# Patient Record
Sex: MALE
[De-identification: names, ages, dates, MRNs, and addresses within clinical notes are randomized per-mention and may not be internally consistent; named-entity substitution may affect disease eponyms.]

---

## 2020-03-21 ENCOUNTER — HOSPITAL ENCOUNTER (INPATIENT)
Dept: HOSPITAL 19 - MEDICAL | Age: 50
LOS: 1 days | Discharge: HOME | DRG: 440 | End: 2020-03-22
Attending: STUDENT IN AN ORGANIZED HEALTH CARE EDUCATION/TRAINING PROGRAM | Admitting: STUDENT IN AN ORGANIZED HEALTH CARE EDUCATION/TRAINING PROGRAM
Payer: OTHER GOVERNMENT

## 2020-03-21 VITALS — SYSTOLIC BLOOD PRESSURE: 113 MMHG | DIASTOLIC BLOOD PRESSURE: 63 MMHG | HEART RATE: 64 BPM | TEMPERATURE: 99.5 F

## 2020-03-21 VITALS — WEIGHT: 172.18 LBS | BODY MASS INDEX: 27.02 KG/M2 | HEIGHT: 67 IN

## 2020-03-21 VITALS — HEART RATE: 98 BPM | TEMPERATURE: 98.7 F | SYSTOLIC BLOOD PRESSURE: 96 MMHG | DIASTOLIC BLOOD PRESSURE: 58 MMHG

## 2020-03-21 VITALS — TEMPERATURE: 98.9 F | DIASTOLIC BLOOD PRESSURE: 76 MMHG | HEART RATE: 77 BPM | SYSTOLIC BLOOD PRESSURE: 122 MMHG

## 2020-03-21 VITALS — HEART RATE: 86 BPM | TEMPERATURE: 98.2 F | SYSTOLIC BLOOD PRESSURE: 117 MMHG | DIASTOLIC BLOOD PRESSURE: 68 MMHG

## 2020-03-21 VITALS — DIASTOLIC BLOOD PRESSURE: 60 MMHG | HEART RATE: 68 BPM | TEMPERATURE: 98.1 F | SYSTOLIC BLOOD PRESSURE: 110 MMHG

## 2020-03-21 VITALS — SYSTOLIC BLOOD PRESSURE: 95 MMHG | HEART RATE: 63 BPM | TEMPERATURE: 97.9 F | DIASTOLIC BLOOD PRESSURE: 41 MMHG

## 2020-03-21 VITALS — DIASTOLIC BLOOD PRESSURE: 76 MMHG | HEART RATE: 77 BPM | SYSTOLIC BLOOD PRESSURE: 122 MMHG | TEMPERATURE: 98.9 F

## 2020-03-21 DIAGNOSIS — K85.90: Primary | ICD-10-CM

## 2020-03-21 DIAGNOSIS — F17.210: ICD-10-CM

## 2020-03-21 DIAGNOSIS — Z88.5: ICD-10-CM

## 2020-03-21 DIAGNOSIS — K86.1: ICD-10-CM

## 2020-03-21 LAB
ALBUMIN SERPL-MCNC: 3.8 GM/DL (ref 3.5–5)
ALP SERPL-CCNC: 58 U/L (ref 50–136)
ALT SERPL-CCNC: 25 U/L (ref 4–49)
ANION GAP SERPL CALC-SCNC: 8 MMOL/L (ref 7–16)
AST SERPL-CCNC: 23 U/L (ref 15–37)
BASOPHILS # BLD: 0.1 10*3/UL (ref 0–0.2)
BASOPHILS NFR BLD AUTO: 0.4 % (ref 0–2)
BILIRUB SERPL-MCNC: 0.5 MG/DL (ref 0–1)
BUN SERPL-MCNC: 10 MG/DL (ref 9–20)
CALCIUM SERPL-MCNC: 8.5 MG/DL (ref 8.4–10.2)
CHLORIDE SERPL-SCNC: 107 MMOL/L (ref 98–107)
CO2 SERPL-SCNC: 25 MMOL/L (ref 22–30)
CREAT SERPL-SCNC: 0.78 UMOL/L (ref 0.66–1.25)
EOSINOPHIL # BLD: 0.2 10*3/UL (ref 0–0.7)
EOSINOPHIL NFR BLD: 1.6 % (ref 0–4)
ERYTHROCYTE [DISTWIDTH] IN BLOOD BY AUTOMATED COUNT: 14.6 % (ref 11.5–14.5)
GLUCOSE SERPL-MCNC: 85 MG/DL (ref 74–106)
GRANULOCYTES # BLD AUTO: 59 % (ref 42.2–75.2)
HCT VFR BLD AUTO: 43.8 % (ref 42–52)
HGB BLD-MCNC: 13.8 G/DL (ref 13.5–18)
INR BLD: 1.1 (ref 0.8–3)
LDH SERPL-CCNC: 334 U/L (ref 313–618)
LIPASE SERPL-CCNC: 447 U/L (ref 23–300)
LYMPHOCYTES # BLD: 3.5 10*3/UL (ref 1.2–3.4)
LYMPHOCYTES NFR BLD: 30.3 % (ref 20–51)
MAGNESIUM SERPL-MCNC: 2.3 MG/DL (ref 1.6–2.3)
MCH RBC QN AUTO: 27 PG (ref 27–31)
MCHC RBC AUTO-ENTMCNC: 32 G/DL (ref 33–37)
MCV RBC AUTO: 84 FL (ref 80–100)
MONOCYTES # BLD: 1 10*3/UL (ref 0.1–0.6)
MONOCYTES NFR BLD AUTO: 8.5 % (ref 1.7–9.3)
NEUTROPHILS # BLD: 6.8 10*3/UL (ref 1.4–6.5)
PHOSPHATE SERPL-MCNC: 4 MG/DL (ref 2.5–4.5)
PLATELET # BLD AUTO: 292 K/MM3 (ref 130–400)
PMV BLD AUTO: 9 FL (ref 7.4–10.4)
POTASSIUM SERPL-SCNC: 3.9 MMOL/L (ref 3.4–5)
PROT SERPL-MCNC: 6.6 GM/DL (ref 6.4–8.2)
PROTHROMBIN TIME: 12.6 SECONDS (ref 9.7–12.8)
RBC # BLD AUTO: 5.21 M/MM3 (ref 4.2–5.6)
SODIUM SERPL-SCNC: 140 MMOL/L (ref 137–145)

## 2020-03-21 NOTE — NUR
PATIENT
IS ALERT AND ORIENTED. COMPLAINED OF EPIGASTRIC AND RLQ ABDOMEN PAIN
AT 6/10. LAST BM WAS AT 4PM 03/20/20. HEART SOUND IS NORMAL, LUNG IS CLEAR. NO
EDEMA

## 2020-03-21 NOTE — NUR
pt voicing that the norco has helped and stated this evenining that he felt
like he was feeling good enough now to discharge tomorrow. this nurse took pt
for a walk out to the 1st floor patio this afternoon, stated he felt cooped up
in his room, no c/o pain after walk.

## 2020-03-21 NOTE — NUR
PT BROUGHT UP TO FLOOR AT 0400. ORIENTED TO ROOM, PT PLEASANT, ACCENT PRESENT,
WARM BLANKET BROUGHT IN, NPO, FLUIDS GOING, PAIN MEDS ADMINISTERED, TABLE AT
BEDSIDE, BED IN LOW POSITION, ADMISSION B, MED REC, COVID19 SCREEN COMPLETED.
NO OTHER NEEDS AT THIS TIME.

## 2020-03-22 VITALS — SYSTOLIC BLOOD PRESSURE: 103 MMHG | TEMPERATURE: 98.2 F | DIASTOLIC BLOOD PRESSURE: 58 MMHG | HEART RATE: 60 BPM

## 2020-03-22 VITALS — TEMPERATURE: 99.3 F | DIASTOLIC BLOOD PRESSURE: 62 MMHG | HEART RATE: 60 BPM | SYSTOLIC BLOOD PRESSURE: 112 MMHG

## 2020-03-22 NOTE — NUR
PT HAD UNEVENTFUL DAY. REMOVED PTS IV AND TELE WITHOUT ISSUES. DISCUSSED PTS
DISCHARGE INFO WITH HIM. NO QUESTIONS ASKED. PT STATED HE WILL LET US KNOW
WHEN HIS RIDE IS HERE TO BE ESCORTED [facility].

## 2020-03-22 NOTE — NUR
Se met with client. Client was very vague. Client shares that we have asked
questions multiple times and we should have the information. Client reports
that he has a wife and he has transportation. Client shares that his PCP is on
[place] and he also obtains medications from [facility]. Client denies the
use of any DME. Client declined given names and phone numbers. Client denies
having a DPOA.  Nothing further.

## 2020-03-22 NOTE — NUR
PT RESTED QUIETLY/SLEPT MOST OF NIGHT.  ADMINISTERED NORCO TWICE DURING NIGHT
SHIFT.  PT WORRIED THAT HIS PAIN CAME BACK A SECOND TIME DURING THE NIGHT.